# Patient Record
Sex: MALE | Race: OTHER | Employment: UNEMPLOYED | URBAN - METROPOLITAN AREA
[De-identification: names, ages, dates, MRNs, and addresses within clinical notes are randomized per-mention and may not be internally consistent; named-entity substitution may affect disease eponyms.]

---

## 2024-03-21 ENCOUNTER — OFFICE VISIT (OUTPATIENT)
Dept: URGENT CARE | Age: 64
End: 2024-03-21

## 2024-03-21 VITALS
DIASTOLIC BLOOD PRESSURE: 87 MMHG | SYSTOLIC BLOOD PRESSURE: 143 MMHG | TEMPERATURE: 98.2 F | HEART RATE: 79 BPM | WEIGHT: 216 LBS | OXYGEN SATURATION: 95 %

## 2024-03-21 DIAGNOSIS — M19.90 ARTHRITIS: Primary | ICD-10-CM

## 2024-03-21 DIAGNOSIS — I10 HYPERTENSION, UNSPECIFIED TYPE: ICD-10-CM

## 2024-03-21 RX ORDER — ENALAPRIL MALEATE 5 MG/1
5 TABLET ORAL DAILY
COMMUNITY

## 2024-03-21 ASSESSMENT — ENCOUNTER SYMPTOMS
ABDOMINAL PAIN: 0
DIARRHEA: 0
NAUSEA: 0
VOMITING: 0
SHORTNESS OF BREATH: 0
EYE PAIN: 0

## 2024-03-21 NOTE — PROGRESS NOTES
Wilfredo Grady (: 1960) is a 63 y.o. male, New patient, here for evaluation of the following chief complaint(s):  Joint Pain (Pain in right arm and leg)      ASSESSMENT/PLAN:    ICD-10-CM    1. Arthritis  M19.90 diclofenac (VOLTAREN) 50 MG EC tablet      2. Hypertension, unspecified type  I10           - No need for imaging at this time. Low concern for cellulitis, septic joint, compartment syndrome, neurovascular compromise or sepsis.   - Pt to drink lots of fluids  - Pt to take medication as prescribed  - Pt ok to take tylenol as needed  - Pt to call if any symptoms worsen or follow up with PCP  - Pt to go to ER if have shortness of breath or chest pain    Follow up with your PCP within 5 days or, if unable, you can return to the clinic if symptoms persist beyond 5 days or if symptoms worsen.  The patient tolerated their visit well. The patient and/or the family were informed of the results of any tests, a time was given to answer questions, a plan was proposed and they agreed with plan. Reviewed AVS with treatment instructions and answered questions - pt/family expresses understanding and agreement with the discussed treatment plan and AVS instructions.    SUBJECTIVE/OBJECTIVE:  HPI:   63 y.o. male presents for complaint of pt states he has a hx of arthritis and he states he will usually take diclofenac for the arthritis and he is visiting his daughter here in [place] and did not bring enough diclofenac to get him through the trip. Pt states the pain is usually in his right elbow and right hip. Pt denies any recent falls or trauma to right elbow or right hip. Pt denies any radiating pain into his right fingers or right toes. Pt denies any decrease in ROM of right elbow or right hip.    Has taken diclofenac which seems to help.       History provided by:  Patient   used: No        VITAL SIGNS  Vitals:    24 1839   BP: (!) 143/87   Site: Left Upper Arm   Position: Sitting   Cuff Size: